# Patient Record
Sex: MALE | Race: WHITE
[De-identification: names, ages, dates, MRNs, and addresses within clinical notes are randomized per-mention and may not be internally consistent; named-entity substitution may affect disease eponyms.]

---

## 2019-09-27 ENCOUNTER — HOSPITAL ENCOUNTER (EMERGENCY)
Dept: HOSPITAL 1 - ED | Age: 39
Discharge: HOME | End: 2019-09-27
Payer: SELF-PAY

## 2019-09-27 VITALS — BODY MASS INDEX: 27.49 KG/M2 | HEIGHT: 64 IN | WEIGHT: 161 LBS

## 2019-09-27 VITALS — SYSTOLIC BLOOD PRESSURE: 136 MMHG | DIASTOLIC BLOOD PRESSURE: 92 MMHG
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
## 2019-09-27 DIAGNOSIS — S39.012A: Primary | ICD-10-CM
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
## 2020-12-21 ENCOUNTER — HOSPITAL ENCOUNTER (EMERGENCY)
Dept: HOSPITAL 4 - SED | Age: 40
Discharge: HOME | End: 2020-12-21
Payer: COMMERCIAL

## 2020-12-21 VITALS — SYSTOLIC BLOOD PRESSURE: 153 MMHG

## 2020-12-21 VITALS — WEIGHT: 223 LBS | BODY MASS INDEX: 37.15 KG/M2 | HEIGHT: 65 IN

## 2020-12-21 DIAGNOSIS — U07.1: Primary | ICD-10-CM

## 2020-12-21 DIAGNOSIS — M79.18: ICD-10-CM

## 2020-12-21 PROCEDURE — 99283 EMERGENCY DEPT VISIT LOW MDM: CPT

## 2020-12-21 PROCEDURE — C9803 HOPD COVID-19 SPEC COLLECT: HCPCS

## 2020-12-21 PROCEDURE — U0003 INFECTIOUS AGENT DETECTION BY NUCLEIC ACID (DNA OR RNA); SEVERE ACUTE RESPIRATORY SYNDROME CORONAVIRUS 2 (SARS-COV-2) (CORONAVIRUS DISEASE [COVID-19]), AMPLIFIED PROBE TECHNIQUE, MAKING USE OF HIGH THROUGHPUT TECHNOLOGIES AS DESCRIBED BY CMS-2020-01-R: HCPCS

## 2020-12-21 NOTE — NUR
Patient given written and verbal discharge instructions and verbalizes 
understanding.  ER MD discussed with patient the results and treatment 
provided. Patient in stable condition. ID arm band removed.

 Patient educated on pain management and to follow up with PMD. Pain Scale  
0/10.

Opportunity for questions provided and answered. Medication side effect fact 
sheet provided.

## 2020-12-21 NOTE — NUR
Pt brought by self, A&Ox4, pt presents to ER with bodyaches,pt exposed to covid 
, skin pink and warm,cap refill <3, VSS